# Patient Record
Sex: FEMALE | Race: WHITE | Employment: OTHER | URBAN - METROPOLITAN AREA
[De-identification: names, ages, dates, MRNs, and addresses within clinical notes are randomized per-mention and may not be internally consistent; named-entity substitution may affect disease eponyms.]

---

## 2017-01-23 PROBLEM — E03.9 HYPOTHYROIDISM, UNSPECIFIED: Chronic | Status: ACTIVE | Noted: 2017-01-23

## 2017-12-17 ENCOUNTER — OFFICE VISIT (OUTPATIENT)
Dept: FAMILY MEDICINE CLINIC | Age: 77
End: 2017-12-17
Payer: MEDICARE

## 2017-12-17 VITALS
BODY MASS INDEX: 19.99 KG/M2 | SYSTOLIC BLOOD PRESSURE: 131 MMHG | DIASTOLIC BLOOD PRESSURE: 84 MMHG | HEIGHT: 65 IN | RESPIRATION RATE: 16 BRPM | OXYGEN SATURATION: 96 % | TEMPERATURE: 97.9 F | WEIGHT: 120 LBS | HEART RATE: 72 BPM

## 2017-12-17 DIAGNOSIS — M79.672 LEFT FOOT PAIN: Primary | ICD-10-CM

## 2017-12-17 PROCEDURE — G8420 CALC BMI NORM PARAMETERS: HCPCS | Performed by: INTERNAL MEDICINE

## 2017-12-17 PROCEDURE — G8427 DOCREV CUR MEDS BY ELIG CLIN: HCPCS | Performed by: INTERNAL MEDICINE

## 2017-12-17 PROCEDURE — 1123F ACP DISCUSS/DSCN MKR DOCD: CPT | Performed by: INTERNAL MEDICINE

## 2017-12-17 PROCEDURE — 1090F PRES/ABSN URINE INCON ASSESS: CPT | Performed by: INTERNAL MEDICINE

## 2017-12-17 PROCEDURE — 4040F PNEUMOC VAC/ADMIN/RCVD: CPT | Performed by: INTERNAL MEDICINE

## 2017-12-17 PROCEDURE — G8400 PT W/DXA NO RESULTS DOC: HCPCS | Performed by: INTERNAL MEDICINE

## 2017-12-17 PROCEDURE — 99213 OFFICE O/P EST LOW 20 MIN: CPT | Performed by: INTERNAL MEDICINE

## 2017-12-17 PROCEDURE — G8484 FLU IMMUNIZE NO ADMIN: HCPCS | Performed by: INTERNAL MEDICINE

## 2017-12-17 PROCEDURE — 1036F TOBACCO NON-USER: CPT | Performed by: INTERNAL MEDICINE

## 2017-12-17 ASSESSMENT — ENCOUNTER SYMPTOMS
COUGH: 0
SHORTNESS OF BREATH: 0

## 2017-12-17 NOTE — PATIENT INSTRUCTIONS
Patient Education        Foot Pain: Care Instructions  Your Care Instructions  Foot injuries that cause pain and swelling are fairly common. Almost all sports or home repair projects can cause a misstep that ends up as foot pain. Normal wear and tear, especially as you get older, also can cause foot pain. Most minor foot injuries will heal on their own, and home treatment is usually all you need to do. If you have a severe injury, you may need tests and treatment. Follow-up care is a key part of your treatment and safety. Be sure to make and go to all appointments, and call your doctor if you are having problems. It's also a good idea to know your test results and keep a list of the medicines you take. How can you care for yourself at home? · Take pain medicines exactly as directed. ¨ If the doctor gave you a prescription medicine for pain, take it as prescribed. ¨ If you are not taking a prescription pain medicine, ask your doctor if you can take an over-the-counter medicine. · Rest and protect your foot. Take a break from any activity that may cause pain. · Put ice or a cold pack on your foot for 10 to 20 minutes at a time. Put a thin cloth between the ice and your skin. · Prop up the sore foot on a pillow when you ice it or anytime you sit or lie down during the next 3 days. Try to keep it above the level of your heart. This will help reduce swelling. · Your doctor may recommend that you wrap your foot with an elastic bandage. Keep your foot wrapped for as long as your doctor advises. · If your doctor recommends crutches, use them as directed. · Wear roomy footwear. · As soon as pain and swelling end, begin gentle exercises of your foot. Your doctor can tell you which exercises will help. When should you call for help? Call 911 anytime you think you may need emergency care. For example, call if:  ? · Your foot turns pale, white, blue, or cold.    ?Call your doctor now or seek immediate medical care if:  ? · You cannot move or stand on your foot. ? · Your foot looks twisted or out of its normal position. ? · Your foot is not stable when you step down. ? · You have signs of infection, such as:  ¨ Increased pain, swelling, warmth, or redness. ¨ Red streaks leading from the sore area. ¨ Pus draining from a place on your foot. ¨ A fever. ? · Your foot is numb or tingly. ? Watch closely for changes in your health, and be sure to contact your doctor if:  ? · You do not get better as expected. ? · You have bruises from an injury that last longer than 2 weeks. Where can you learn more? Go to [email or web address]://Laimoon.com[employer]Funding Gateseb.[email or web address] org and sign in to your [employer] account. Enter Y556 in the Zaizher.im box to learn more about \"Foot Pain: Care Instructions. \"     If you do not have an account, please click on the \"Sign Up Now\" link. Current as of: March 21, 2017  Content Version: 11.4  © 3344-7842 Healthwise, Incorporated. Care instructions adapted under license by [facility] ([facility]). If you have questions about a medical condition or this instruction, always ask your healthcare professional. [place] [address] any warranty or liability for your use of this information.

## 2017-12-17 NOTE — PROGRESS NOTES
Subjective:      Patient ID: Matheus Santos is a 68 y.o. female. HPI  Tripped over shoes and twisted left foot 2 hours PTA; has pain weight bearing left foot. Discussed icing 15 minutes at a time and tylenol. Patient declined crutches. Review of Systems   Respiratory: Negative for cough and shortness of breath. Cardiovascular: Negative for chest pain. Neurological: Negative for syncope. Objective:   Physical Exam   Constitutional: No distress. HENT:   Head: Normocephalic and atraumatic. Eyes: Conjunctivae and EOM are normal. Pupils are equal, round, and reactive to light. No scleral icterus. Neck: No tracheal deviation present. Cardiovascular: Normal rate, regular rhythm and normal heart sounds. Exam reveals no gallop and no friction rub. No murmur heard. Pulmonary/Chest: Effort normal and breath sounds normal. No stridor. No respiratory distress. She has no wheezes. She has no rales. Musculoskeletal: She exhibits tenderness (tender left 5th metatarsal; no malleolar ankle tenderenss). She exhibits no edema. Neurological: She is alert. No cranial nerve deficit. She exhibits normal muscle tone. Coordination normal.   Skin: No rash noted. She is not diaphoretic. No erythema. Psychiatric: She has a normal mood and affect. Her behavior is normal. Judgment and thought content normal.   Vitals reviewed. 1. Left foot pain  XR FOOT LEFT (MIN 3 VIEWS)     No orders of the defined types were placed in this encounter. Return for : If not better in 2 days seek medical attention.

## 2019-07-27 ENCOUNTER — HOSPITAL ENCOUNTER (EMERGENCY)
Facility: CLINIC | Age: 79
Discharge: HOME | End: 2019-07-27
Attending: EMERGENCY MEDICINE
Payer: MEDICARE

## 2019-07-27 ENCOUNTER — APPOINTMENT (OUTPATIENT)
Dept: GENERAL RADIOLOGY | Facility: CLINIC | Age: 79
End: 2019-07-27
Payer: MEDICARE

## 2019-07-27 VITALS
WEIGHT: 115 LBS | OXYGEN SATURATION: 97 % | HEART RATE: 72 BPM | TEMPERATURE: 98.7 F | HEIGHT: 65 IN | SYSTOLIC BLOOD PRESSURE: 138 MMHG | RESPIRATION RATE: 15 BRPM | DIASTOLIC BLOOD PRESSURE: 85 MMHG | BODY MASS INDEX: 19.16 KG/M2

## 2019-07-27 DIAGNOSIS — S82.891A CLOSED FRACTURE OF RIGHT ANKLE, INITIAL ENCOUNTER: Primary | ICD-10-CM

## 2019-07-27 PROCEDURE — 99283 EMERGENCY DEPT VISIT LOW MDM: CPT

## 2019-07-27 PROCEDURE — 73610 X-RAY EXAM OF ANKLE: CPT

## 2019-07-27 RX ORDER — IBUPROFEN 600 MG/1
600 TABLET ORAL EVERY 6 HOURS PRN
Qty: 30 TABLET | Refills: 0 | Status: SHIPPED | OUTPATIENT
Start: 2019-07-27 | End: 2019-08-13

## 2019-07-27 ASSESSMENT — PAIN DESCRIPTION - ORIENTATION: ORIENTATION: RIGHT

## 2019-07-27 ASSESSMENT — PAIN DESCRIPTION - LOCATION: LOCATION: ANKLE;FOOT

## 2019-07-27 ASSESSMENT — PAIN SCALES - GENERAL: PAINLEVEL_OUTOF10: 10

## 2019-07-27 ASSESSMENT — PAIN DESCRIPTION - PAIN TYPE: TYPE: ACUTE PAIN

## 2019-07-27 NOTE — ED PROVIDER NOTES
Previous Medications    LEVOTHYROXINE (SYNTHROID) 75 MCG TABLET    take 1 tablet by mouth once daily    SIMVASTATIN (ZOCOR) 20 MG TABLET    Take 1 tablet by mouth nightly       ALLERGIES     is allergic to sulfa antibiotics. FAMILY HISTORY     She indicated that her mother is . She indicated that her father is . family history includes Heart Disease in her mother; Stroke in her mother. SOCIAL HISTORY      reports that she has been smoking. She started smoking about 4 years ago. She has a 10.00 pack-year smoking history. She has never used smokeless tobacco. She reports that she does not drink alcohol or use drugs. PHYSICAL EXAM     INITIAL VITALS:  height is 5' 5\" (1.651 m) and weight is 52.2 kg (115 lb). Her oral temperature is 98.7 °F (37.1 °C). Her blood pressure is 138/85 and her pulse is 72. Her respiration is 15 and oxygen saturation is 97%. Patient is alert and oriented, in no apparent distress. HEENT:  Superficial abrasion to right scalp. No hematoma. Pupils are PERRL at 4 mm with normal extraocular motion. Mucous membranes moist.    Neck is supple with no lymphadenopathy. No JVD. No meningismus. No pain or step-off. Heart sounds regular rate and rhythm with no gallops, murmurs, or rubs. Lungs clear, no wheezes, rales or rhonchi. Abdomen: soft, nontender with no pain to palpation. Musculoskeletal exam:  Swelling and tenderness noted over the right lateral malleolus. Some mild discoloration is noted over the right medial malleolus. No ligamentous laxity. No pain in the proximal calf. No pain over the proximal tibia or fibula. The remainder of the musculoskeletal exam is unremarkable. Normal distal pulses in all extremities. Skin: no rash or edema. Neurological exam reveals cranial nerves 2 through 12 grossly intact. Patient has equal  and normal deep tendon reflexes. Psychiatric: no hallucinations or suicidal ideation.

## 2019-07-27 NOTE — ED NOTES
Pt arrives to ER via w/c. Pt states that [place] campground [place] when she twisted her ankle, and fell. She states that she tripped over a dog leash. Pt states that she hit her head, denies LOC. She c/o right ankle and foot pain. Dark purple bruising and swelling noted to right ankle and foot. Pt resting in bed, comfort offered, no concerns no s/s of distress.       Krystina Bradford RN  07/27/19 [id number]

## 2019-07-30 ENCOUNTER — OFFICE VISIT (OUTPATIENT)
Dept: PODIATRY | Age: 79
End: 2019-07-30
Payer: MEDICARE

## 2019-07-30 VITALS — HEIGHT: 65 IN | WEIGHT: 115 LBS | BODY MASS INDEX: 19.16 KG/M2

## 2019-07-30 DIAGNOSIS — S82.64XA CLOSED NONDISPLACED FRACTURE OF LATERAL MALLEOLUS OF RIGHT FIBULA, INITIAL ENCOUNTER: Primary | ICD-10-CM

## 2019-07-30 DIAGNOSIS — M25.571 ACUTE RIGHT ANKLE PAIN: ICD-10-CM

## 2019-07-30 PROCEDURE — 1036F TOBACCO NON-USER: CPT | Performed by: PODIATRIST

## 2019-07-30 PROCEDURE — 1090F PRES/ABSN URINE INCON ASSESS: CPT | Performed by: PODIATRIST

## 2019-07-30 PROCEDURE — G8427 DOCREV CUR MEDS BY ELIG CLIN: HCPCS | Performed by: PODIATRIST

## 2019-07-30 PROCEDURE — G8400 PT W/DXA NO RESULTS DOC: HCPCS | Performed by: PODIATRIST

## 2019-07-30 PROCEDURE — 1123F ACP DISCUSS/DSCN MKR DOCD: CPT | Performed by: PODIATRIST

## 2019-07-30 PROCEDURE — G8420 CALC BMI NORM PARAMETERS: HCPCS | Performed by: PODIATRIST

## 2019-07-30 PROCEDURE — 99203 OFFICE O/P NEW LOW 30 MIN: CPT | Performed by: PODIATRIST

## 2019-07-30 PROCEDURE — 4040F PNEUMOC VAC/ADMIN/RCVD: CPT | Performed by: PODIATRIST

## 2019-07-30 PROCEDURE — 29405 APPL SHORT LEG CAST: CPT | Performed by: PODIATRIST

## 2019-07-30 ASSESSMENT — ENCOUNTER SYMPTOMS
DIARRHEA: 0
VOMITING: 0
CONSTIPATION: 0
NAUSEA: 0
COLOR CHANGE: 0

## 2019-07-31 ENCOUNTER — TELEPHONE (OUTPATIENT)
Dept: PODIATRY | Age: 79
End: 2019-07-31

## 2019-07-31 NOTE — TELEPHONE ENCOUNTER
PATIENT CALLED STATING HER CAST HAS BEEN BOTHERING HER SINCE IT WAS APPLIED LAST APPOINTMENT.  SHE SAID IT IS RUBBING HER ANKLE AND CAUSING PAIN IS THERE ANYTHING SHE CAN DO?

## 2019-07-31 NOTE — PROGRESS NOTES
[facility]  New Patient History and Physical    Chief Complaint:   Chief Complaint   Patient presents with    Ankle Pain     fell friday and broke right foot, pain is okay if not moving        HPI: Timothy Keyes is a 78 y.o. female who presents to the office today complaining of right ankle pain, fracture, was camping, tripped over a dog, 4 days ago, went to the ER, non-displaced fracture fibula, posterior splint, non-weight bearing. Currently denies F/C/N/V. Allergies   Allergen Reactions    Sulfa Antibiotics Hives and Shortness Of Breath       Past Medical History:   Diagnosis Date    Body mass index (BMI) of 20.0-20.9 in adult 8/27/2015    Endometriosis     had hyst    Former smoker 8/27/2015    Full dentures     Hearing decreased     History of menorrhagia     had hyst    Hyperlipidemia     Hyperlipidemia LDL goal < 130 8/27/2015    Hypothyroid     Nephrolithiasis     Left    Sciatica     Wears glasses        Prior to Admission medications    Medication Sig Start Date End Date Taking?  Authorizing Provider   ibuprofen (ADVIL;MOTRIN) 600 MG tablet Take 1 tablet by mouth every 6 hours as needed for Pain 7/27/19  Yes Alonso Jackson MD   levothyroxine (SYNTHROID) 75 MCG tablet take 1 tablet by mouth once daily 5/1/19  Yes Trevor Arrieta MD   simvastatin (ZOCOR) 20 MG tablet Take 1 tablet by mouth nightly 5/1/19  Yes Trevor Arrieta MD       Past Surgical History:   Procedure Laterality Date    BACK SURGERY      \"used a small scope to Laser a few things\" Lumbar    BREAST BIOPSY Left     found cystic breast    CATARACT REMOVAL WITH IMPLANT Bilateral     COLONOSCOPY      HYSTERECTOMY      with BSO    LITHOTRIPSY Left 3-12-15    laser litho, stent exchanged    TONSILLECTOMY         Family History   Problem Relation Age of Onset    Heart Disease Mother     Stroke Mother        Social History     Tobacco Use    Smoking status: Former Smoker     Packs/day: 0.25 Years: 40.00     Pack years: 10.00     Start date: 2015     Last attempt to quit: 7/15/2019     Years since quittin.0    Smokeless tobacco: Never Used   Substance Use Topics    Alcohol use: No     Alcohol/week: 0.0 standard drinks       Review of Systems   Constitutional: Negative for chills, diaphoresis, fatigue, fever and unexpected weight change. Cardiovascular: Positive for leg swelling. Gastrointestinal: Negative for constipation, diarrhea, nausea and vomiting. Musculoskeletal: Positive for gait problem. Negative for arthralgias and joint swelling. Skin: Negative for color change, pallor, rash and wound. Neurological: Negative for weakness and numbness. Lower Extremity Physical Examination:     Vitals: There were no vitals filed for this visit. General: AAO x 3 in NAD. Vascular: DP and PT pulses palpable 2/4, Bilateral.  CFT <3 seconds, Bilateral.  Hair growth absent to the level of the digits, Bilateral.  Edema present, Right. Varicosities absent, Bilateral. Erythema absent, Bilateral    Neurological:  Sensation present to light touch to level of digits, Bilateral.    Musculoskeletal: Muscle strength guarded/5, Right. Pain present upon palpation of tip of fibula, none medial, none on foot, Right. normal medial longitudinal arch, Bilateral.  Ankle ROM abnormal - painful,Right. Integument: Warm, dry, supple, Bilateral.  Open lesion absent, Bilateral.     Gait analysis:     Asessment: Patient is a 78 y.o. female with:   1. Closed nondisplaced fracture of lateral malleolus of right fibula, initial encounter    2. Acute right ankle pain          Plan: Patient examined and evaluated. Current condition and treatment options discussed in detail. Verbal and written instructions given to patient. Contact office with any questions/problems/concerns. Below knee non-weight bearing cast applied using stockinet, cast padding, and 4 inch fiberglass to the right lower leg.  The foot

## 2019-08-01 ENCOUNTER — OFFICE VISIT (OUTPATIENT)
Dept: PODIATRY | Age: 79
End: 2019-08-01
Payer: MEDICARE

## 2019-08-01 VITALS — WEIGHT: 115 LBS | BODY MASS INDEX: 19.16 KG/M2 | HEIGHT: 65 IN

## 2019-08-01 DIAGNOSIS — S82.64XA CLOSED NONDISPLACED FRACTURE OF LATERAL MALLEOLUS OF RIGHT FIBULA, INITIAL ENCOUNTER: Primary | ICD-10-CM

## 2019-08-01 DIAGNOSIS — M25.571 ACUTE RIGHT ANKLE PAIN: ICD-10-CM

## 2019-08-01 PROCEDURE — L4360 PNEUMAT WALKING BOOT PRE CST: HCPCS | Performed by: PODIATRIST

## 2019-08-01 PROCEDURE — 1123F ACP DISCUSS/DSCN MKR DOCD: CPT | Performed by: PODIATRIST

## 2019-08-01 PROCEDURE — 1036F TOBACCO NON-USER: CPT | Performed by: PODIATRIST

## 2019-08-01 PROCEDURE — G8400 PT W/DXA NO RESULTS DOC: HCPCS | Performed by: PODIATRIST

## 2019-08-01 PROCEDURE — 1090F PRES/ABSN URINE INCON ASSESS: CPT | Performed by: PODIATRIST

## 2019-08-01 PROCEDURE — 99213 OFFICE O/P EST LOW 20 MIN: CPT | Performed by: PODIATRIST

## 2019-08-01 PROCEDURE — G8427 DOCREV CUR MEDS BY ELIG CLIN: HCPCS | Performed by: PODIATRIST

## 2019-08-01 PROCEDURE — G8420 CALC BMI NORM PARAMETERS: HCPCS | Performed by: PODIATRIST

## 2019-08-01 PROCEDURE — 4040F PNEUMOC VAC/ADMIN/RCVD: CPT | Performed by: PODIATRIST

## 2019-08-01 RX ORDER — IBUPROFEN 800 MG/1
800 TABLET ORAL EVERY 8 HOURS PRN
Qty: 90 TABLET | Refills: 0 | Status: SHIPPED | OUTPATIENT
Start: 2019-08-01 | End: 2019-09-02 | Stop reason: SDUPTHER

## 2019-08-01 RX ORDER — TRAMADOL HYDROCHLORIDE 50 MG/1
50 TABLET ORAL EVERY 6 HOURS PRN
Qty: 28 TABLET | Refills: 0 | Status: SHIPPED | OUTPATIENT
Start: 2019-08-01 | End: 2019-08-08

## 2019-08-01 NOTE — PROGRESS NOTES
[facility]  Return Patient     Kiko Bishop 78 y.o. female that presents for follow-up of   Chief Complaint   Patient presents with    Cast Removal     cast removal right       Patient called yesterday having pain in her ankle, sometimes the cast felt tight, sometimes loose. Has been staying off of it. Did stumble in the bathroom. Currently denies F/C/N/V. Allergies   Allergen Reactions    Sulfa Antibiotics Hives and Shortness Of Breath       Past Medical History:   Diagnosis Date    Body mass index (BMI) of 20.0-20.9 in adult 8/27/2015    Endometriosis     had hyst    Former smoker 8/27/2015    Full dentures     Hearing decreased     History of menorrhagia     had hyst    Hyperlipidemia     Hyperlipidemia LDL goal < 130 8/27/2015    Hypothyroid     Nephrolithiasis     Left    Sciatica     Wears glasses        Prior to Admission medications    Medication Sig Start Date End Date Taking? Authorizing Provider   Misc. Devices [facility]) MISC Please dispense one wheelchair with leg lifts. Patient has an ankle fracture and needs to be non- weight bearing at all times. 8/5/19  Yes Caitlin Olson DPM   traMADol (ULTRAM) 50 MG tablet Take 1 tablet by mouth every 6 hours as needed for Pain for up to 7 days. Intended supply: 7 days. Take lowest dose possible to manage pain 8/1/19 8/8/19 Yes Caitlin Olson DPM   ibuprofen (ADVIL;MOTRIN) 800 MG tablet Take 1 tablet by mouth every 8 hours as needed for Pain 8/1/19  Yes Caitlin Olson DPM   ibuprofen (ADVIL;MOTRIN) 600 MG tablet Take 1 tablet by mouth every 6 hours as needed for Pain 7/27/19  Yes Margy Cox MD   levothyroxine (SYNTHROID) 75 MCG tablet take 1 tablet by mouth once daily 5/1/19  Yes Jey Rodas MD   simvastatin (ZOCOR) 20 MG tablet Take 1 tablet by mouth nightly 5/1/19  Yes Jey Rodas MD       Review of Systems    Lower Extremity Physical Examination:     Vitals:  There were no vitals filed for

## 2019-08-05 RX ORDER — WHEELCHAIR
EACH MISCELLANEOUS
Qty: 1 EACH | Refills: 0 | Status: SHIPPED | OUTPATIENT
Start: 2019-08-05 | End: 2019-10-02 | Stop reason: ALTCHOICE

## 2019-08-13 ENCOUNTER — OFFICE VISIT (OUTPATIENT)
Dept: PODIATRY | Age: 79
End: 2019-08-13
Payer: MEDICARE

## 2019-08-13 VITALS — HEIGHT: 65 IN | WEIGHT: 115 LBS | BODY MASS INDEX: 19.16 KG/M2

## 2019-08-13 DIAGNOSIS — S82.64XA CLOSED NONDISPLACED FRACTURE OF LATERAL MALLEOLUS OF RIGHT FIBULA, INITIAL ENCOUNTER: Primary | ICD-10-CM

## 2019-08-13 DIAGNOSIS — M25.571 ACUTE RIGHT ANKLE PAIN: ICD-10-CM

## 2019-08-13 PROCEDURE — 1036F TOBACCO NON-USER: CPT | Performed by: PODIATRIST

## 2019-08-13 PROCEDURE — 1090F PRES/ABSN URINE INCON ASSESS: CPT | Performed by: PODIATRIST

## 2019-08-13 PROCEDURE — G8400 PT W/DXA NO RESULTS DOC: HCPCS | Performed by: PODIATRIST

## 2019-08-13 PROCEDURE — 99213 OFFICE O/P EST LOW 20 MIN: CPT | Performed by: PODIATRIST

## 2019-08-13 PROCEDURE — G8427 DOCREV CUR MEDS BY ELIG CLIN: HCPCS | Performed by: PODIATRIST

## 2019-08-13 PROCEDURE — 1123F ACP DISCUSS/DSCN MKR DOCD: CPT | Performed by: PODIATRIST

## 2019-08-13 PROCEDURE — G8420 CALC BMI NORM PARAMETERS: HCPCS | Performed by: PODIATRIST

## 2019-08-13 PROCEDURE — 4040F PNEUMOC VAC/ADMIN/RCVD: CPT | Performed by: PODIATRIST

## 2019-08-13 NOTE — PROGRESS NOTES
[facility]  Return Patient     Rufino Nguyen 78 y.o. female that presents for follow-up of   Chief Complaint   Patient presents with   Rebeca Ryan Check-Up     reassess fx right     Doing well with the CAM walker. Has been staying off of it. Did stumble in the bathroom. Currently denies F/C/N/V. Allergies   Allergen Reactions    Sulfa Antibiotics Hives and Shortness Of Breath       Past Medical History:   Diagnosis Date    Body mass index (BMI) of 20.0-20.9 in adult 8/27/2015    Endometriosis     had hyst    Former smoker 8/27/2015    Full dentures     Hearing decreased     History of menorrhagia     had hyst    Hyperlipidemia     Hyperlipidemia LDL goal < 130 8/27/2015    Hypothyroid     Nephrolithiasis     Left    Sciatica     Wears glasses        Prior to Admission medications    Medication Sig Start Date End Date Taking? Authorizing Provider   Misc. Devices [facility]) MISC Please dispense one wheelchair with leg lifts. Patient has an ankle fracture and needs to be non- weight bearing at all times. 8/5/19  Yes Pamalee Cobia, DPM   ibuprofen (ADVIL;MOTRIN) 800 MG tablet Take 1 tablet by mouth every 8 hours as needed for Pain 8/1/19  Yes Pamalee Cobia, DPM   levothyroxine (SYNTHROID) 75 MCG tablet take 1 tablet by mouth once daily 5/1/19  Yes Milan Fofana MD   simvastatin (ZOCOR) 20 MG tablet Take 1 tablet by mouth nightly 5/1/19  Yes Milan Fofana MD       Review of Systems    Lower Extremity Physical Examination:     Vitals: There were no vitals filed for this visit. General: AAO x 3 in NAD. Integument:   Warm, dry, supple, Bilateral.  Open lesion absent, Bilateral.     Vascular: DP and PT pulses palpable 2/4, Bilateral.  CFT <3 seconds, Bilateral.  Hair growth absent to the level of the digits, Bilateral.  Edema present, Right.   Varicosities absent, Bilateral. Erythema absent, Right    Neurological:  Sensation present to light touch to level of digits,

## 2019-08-27 ENCOUNTER — OFFICE VISIT (OUTPATIENT)
Dept: PODIATRY | Age: 79
End: 2019-08-27
Payer: MEDICARE

## 2019-08-27 VITALS — WEIGHT: 115 LBS | BODY MASS INDEX: 19.16 KG/M2 | HEIGHT: 65 IN

## 2019-08-27 DIAGNOSIS — M25.571 ACUTE RIGHT ANKLE PAIN: ICD-10-CM

## 2019-08-27 DIAGNOSIS — S82.64XA CLOSED NONDISPLACED FRACTURE OF LATERAL MALLEOLUS OF RIGHT FIBULA, INITIAL ENCOUNTER: Primary | ICD-10-CM

## 2019-08-27 PROCEDURE — 4040F PNEUMOC VAC/ADMIN/RCVD: CPT | Performed by: PODIATRIST

## 2019-08-27 PROCEDURE — 1123F ACP DISCUSS/DSCN MKR DOCD: CPT | Performed by: PODIATRIST

## 2019-08-27 PROCEDURE — 99213 OFFICE O/P EST LOW 20 MIN: CPT | Performed by: PODIATRIST

## 2019-08-27 PROCEDURE — G8427 DOCREV CUR MEDS BY ELIG CLIN: HCPCS | Performed by: PODIATRIST

## 2019-08-27 PROCEDURE — 1036F TOBACCO NON-USER: CPT | Performed by: PODIATRIST

## 2019-08-27 PROCEDURE — 1090F PRES/ABSN URINE INCON ASSESS: CPT | Performed by: PODIATRIST

## 2019-08-27 PROCEDURE — G8420 CALC BMI NORM PARAMETERS: HCPCS | Performed by: PODIATRIST

## 2019-08-27 PROCEDURE — G8400 PT W/DXA NO RESULTS DOC: HCPCS | Performed by: PODIATRIST

## 2019-08-27 NOTE — PROGRESS NOTES
[facility]  Return Patient     Bonnie Fuentes 78 y.o. female that presents for follow-up of   Chief Complaint   Patient presents with   Sandi Bustillos Check-Up     reassess right, doing better     Doing well with the CAM walker. Has been staying off of it. Currently denies F/C/N/V. Allergies   Allergen Reactions    Sulfa Antibiotics Hives and Shortness Of Breath       Past Medical History:   Diagnosis Date    Body mass index (BMI) of 20.0-20.9 in adult 8/27/2015    Endometriosis     had hyst    Former smoker 8/27/2015    Full dentures     Hearing decreased     History of menorrhagia     had hyst    Hyperlipidemia     Hyperlipidemia LDL goal < 130 8/27/2015    Hypothyroid     Nephrolithiasis     Left    Sciatica     Wears glasses        Prior to Admission medications    Medication Sig Start Date End Date Taking? Authorizing Provider   Misc. Devices [facility]) MISC Please dispense one wheelchair with leg lifts. Patient has an ankle fracture and needs to be non- weight bearing at all times. 8/5/19  Yes Benigno Tenorio DPM   ibuprofen (ADVIL;MOTRIN) 800 MG tablet Take 1 tablet by mouth every 8 hours as needed for Pain 8/1/19  Yes Benigno Tenorio DPM   levothyroxine (SYNTHROID) 75 MCG tablet take 1 tablet by mouth once daily 5/1/19  Yes Flo Perkins MD   simvastatin (ZOCOR) 20 MG tablet Take 1 tablet by mouth nightly 5/1/19  Yes Flo Perkins MD       Review of Systems    Lower Extremity Physical Examination:     Vitals: There were no vitals filed for this visit. General: AAO x 3 in NAD. Integument:   Warm, dry, supple, Bilateral.  Open lesion absent, Bilateral.     Vascular: DP and PT pulses palpable 2/4, Bilateral.  CFT <3 seconds, Bilateral.  Hair growth absent to the level of the digits, Bilateral.  Edema present, Right resolved.   Varicosities absent, Bilateral. Erythema absent, Right    Neurological:  Sensation present to light touch to level of digits, Bilateral.    Musculoskeletal: no pain and resolved ruising right lateral ankle. Radiographs: 3 views right Ankle:  Non-displaced fracture of the distal fibula    Asessment: Patient is a 78 y.o. female with:   1. Closed nondisplaced fracture of lateral malleolus of right fibula, initial encounter    2. Acute right ankle pain        Plan: Patient examined and evaluated. Current condition and treatment options discussed in detail. Verbal and written instructions given to patient. Contact office with any questions/problems/concerns. Weight bearing in the cam walker  Continue CAM walker 3 more weeks,     Electronically signed by Pinky Whitaker DPM on 8/27/2019 at 4:28 PM    Orders Placed This Encounter   Procedures    XR ANKLE RIGHT (MIN 3 VIEWS)     No orders of the defined types were placed in this encounter.        RTC in 3week(s).    8/27/2019

## 2019-09-03 RX ORDER — IBUPROFEN 800 MG/1
TABLET ORAL
Qty: 90 TABLET | Refills: 0 | Status: SHIPPED | OUTPATIENT
Start: 2019-09-03 | End: 2019-10-02 | Stop reason: ALTCHOICE

## 2019-09-16 ENCOUNTER — OFFICE VISIT (OUTPATIENT)
Dept: PODIATRY | Age: 79
End: 2019-09-16
Payer: MEDICARE

## 2019-09-16 VITALS — WEIGHT: 115 LBS | HEIGHT: 65 IN | BODY MASS INDEX: 19.16 KG/M2

## 2019-09-16 DIAGNOSIS — M25.571 ACUTE RIGHT ANKLE PAIN: ICD-10-CM

## 2019-09-16 DIAGNOSIS — S82.64XA CLOSED NONDISPLACED FRACTURE OF LATERAL MALLEOLUS OF RIGHT FIBULA, INITIAL ENCOUNTER: Primary | ICD-10-CM

## 2019-09-16 PROCEDURE — G8427 DOCREV CUR MEDS BY ELIG CLIN: HCPCS | Performed by: PODIATRIST

## 2019-09-16 PROCEDURE — 4040F PNEUMOC VAC/ADMIN/RCVD: CPT | Performed by: PODIATRIST

## 2019-09-16 PROCEDURE — G8400 PT W/DXA NO RESULTS DOC: HCPCS | Performed by: PODIATRIST

## 2019-09-16 PROCEDURE — 1123F ACP DISCUSS/DSCN MKR DOCD: CPT | Performed by: PODIATRIST

## 2019-09-16 PROCEDURE — 1036F TOBACCO NON-USER: CPT | Performed by: PODIATRIST

## 2019-09-16 PROCEDURE — 1090F PRES/ABSN URINE INCON ASSESS: CPT | Performed by: PODIATRIST

## 2019-09-16 PROCEDURE — 99213 OFFICE O/P EST LOW 20 MIN: CPT | Performed by: PODIATRIST

## 2019-09-16 PROCEDURE — L4350 ANKLE CONTROL ORTHO PRE OTS: HCPCS | Performed by: PODIATRIST

## 2019-09-16 PROCEDURE — G8420 CALC BMI NORM PARAMETERS: HCPCS | Performed by: PODIATRIST

## 2019-09-16 ASSESSMENT — ENCOUNTER SYMPTOMS
COLOR CHANGE: 0
CONSTIPATION: 0
NAUSEA: 0
DIARRHEA: 0
VOMITING: 0

## 2019-09-16 NOTE — PROGRESS NOTES
[facility]  Return Patient     Rafael West 78 y.o. female that presents for follow-up of   Chief Complaint   Patient presents with    Follow-up     right foot      Doing well with the CAM walker. Has been weight bearing and using a walker, minimal pain, improving. Currently denies F/C/N/V. Allergies   Allergen Reactions    Sulfa Antibiotics Hives and Shortness Of Breath       Past Medical History:   Diagnosis Date    Body mass index (BMI) of 20.0-20.9 in adult 8/27/2015    Endometriosis     had hyst    Former smoker 8/27/2015    Full dentures     Hearing decreased     History of menorrhagia     had hyst    Hyperlipidemia     Hyperlipidemia LDL goal < 130 8/27/2015    Hypothyroid     Nephrolithiasis     Left    Sciatica     Wears glasses        Prior to Admission medications    Medication Sig Start Date End Date Taking? Authorizing Provider    MG tablet TAKE 1 TABLET BY MOUTH EVERY 8 HOURS AS NEEDED FOR PAIN 9/3/19  Yes Marisol Cronin DPM   Misc. Devices [facility]) MISC Please dispense one wheelchair with leg lifts. Patient has an ankle fracture and needs to be non- weight bearing at all times. 8/5/19  Yes Marisol Cronin DPM   levothyroxine (SYNTHROID) 75 MCG tablet take 1 tablet by mouth once daily 5/1/19  Yes Rosaura Villafana MD   simvastatin (ZOCOR) 20 MG tablet Take 1 tablet by mouth nightly 5/1/19  Yes Rosaura Villafana MD       Review of Systems   Constitutional: Negative for chills, diaphoresis, fatigue, fever and unexpected weight change. Cardiovascular: Negative for leg swelling. Gastrointestinal: Negative for constipation, diarrhea, nausea and vomiting. Musculoskeletal: Positive for gait problem. Negative for arthralgias and joint swelling. Skin: Negative for color change, pallor, rash and wound. Neurological: Negative for weakness and numbness. Lower Extremity Physical Examination:     Vitals:  There were no vitals filed for this visit. General: AAO x 3 in NAD. Integument:   Warm, dry, supple, Bilateral.  Open lesion absent, Bilateral.     Vascular: DP and PT pulses palpable 2/4, Bilateral.  CFT <3 seconds, Bilateral.  Hair growth absent to the level of the digits, Bilateral.  Edema present, Right resolved. Varicosities absent, Bilateral. Erythema absent, Right    Neurological:  Sensation present to light touch to level of digits, Bilateral.    Musculoskeletal: no pain and resolved bruising right lateral ankle. Radiographs: 3 views right Ankle:  Non-displaced fracture of the distal fibula    Asessment: Patient is a 78 y.o. female with:   1. Closed nondisplaced fracture of lateral malleolus of right fibula, initial encounter    2. Acute right ankle pain        Plan: Patient examined and evaluated. Current condition and treatment options discussed in detail. Verbal and written instructions given to patient. Contact office with any questions/problems/concerns. Weight bearing in the cam walker  Transition to an ankle brace    Dispensed Aircast Ankle Brace:  Due to the patient's diagnosis and related symptoms this is medically necessary for the treatment. The function of this device is to restrict and limit motion and provide stabilization and compression to the affected area. The goals and function of this device was explained in detail to the patient. Upon gait analysis, the device appeared to be fitting well and the patient states that the device is comfortable at this time. The patient was shown how to properly apply, wear, and care for the device. The patient was able to apply properly and ambulate without distress. At that time, the device was  dispensed, it was suitable for the patient's condition and was not substandard. No guarantees were given and the precautions were reviewed.  Written instructions and warranty information was given along with the list of the twenty-one (21) Durable Medical Equipment Supplier

## 2019-09-18 ENCOUNTER — NURSE ONLY (OUTPATIENT)
Dept: PODIATRY | Age: 79
End: 2019-09-18

## 2019-09-18 VITALS — HEIGHT: 65 IN | BODY MASS INDEX: 19.16 KG/M2 | WEIGHT: 115 LBS

## 2019-09-18 DIAGNOSIS — S82.64XA CLOSED NONDISPLACED FRACTURE OF LATERAL MALLEOLUS OF RIGHT FIBULA, INITIAL ENCOUNTER: ICD-10-CM

## 2019-09-18 DIAGNOSIS — M25.571 ACUTE RIGHT ANKLE PAIN: Primary | ICD-10-CM

## 2019-10-02 ENCOUNTER — OFFICE VISIT (OUTPATIENT)
Dept: PODIATRY | Age: 79
End: 2019-10-02
Payer: MEDICARE

## 2019-10-02 VITALS — WEIGHT: 115 LBS | BODY MASS INDEX: 19.16 KG/M2 | HEIGHT: 65 IN

## 2019-10-02 DIAGNOSIS — S82.64XA CLOSED NONDISPLACED FRACTURE OF LATERAL MALLEOLUS OF RIGHT FIBULA, INITIAL ENCOUNTER: ICD-10-CM

## 2019-10-02 DIAGNOSIS — M25.571 ACUTE RIGHT ANKLE PAIN: Primary | ICD-10-CM

## 2019-10-02 PROCEDURE — 1090F PRES/ABSN URINE INCON ASSESS: CPT | Performed by: PODIATRIST

## 2019-10-02 PROCEDURE — G8484 FLU IMMUNIZE NO ADMIN: HCPCS | Performed by: PODIATRIST

## 2019-10-02 PROCEDURE — 4040F PNEUMOC VAC/ADMIN/RCVD: CPT | Performed by: PODIATRIST

## 2019-10-02 PROCEDURE — 99213 OFFICE O/P EST LOW 20 MIN: CPT | Performed by: PODIATRIST

## 2019-10-02 PROCEDURE — 1036F TOBACCO NON-USER: CPT | Performed by: PODIATRIST

## 2019-10-02 PROCEDURE — G8420 CALC BMI NORM PARAMETERS: HCPCS | Performed by: PODIATRIST

## 2019-10-02 PROCEDURE — 1123F ACP DISCUSS/DSCN MKR DOCD: CPT | Performed by: PODIATRIST

## 2019-10-02 PROCEDURE — G8400 PT W/DXA NO RESULTS DOC: HCPCS | Performed by: PODIATRIST

## 2019-10-02 PROCEDURE — G8427 DOCREV CUR MEDS BY ELIG CLIN: HCPCS | Performed by: PODIATRIST

## 2019-10-02 ASSESSMENT — ENCOUNTER SYMPTOMS
CONSTIPATION: 0
VOMITING: 0
NAUSEA: 0
DIARRHEA: 0
COLOR CHANGE: 0

## 2020-06-08 ENCOUNTER — TELEPHONE (OUTPATIENT)
Dept: UROLOGY | Age: 80
End: 2020-06-08

## 2020-06-09 ENCOUNTER — TELEPHONE (OUTPATIENT)
Dept: UROLOGY | Age: 80
End: 2020-06-09

## 2020-06-11 ENCOUNTER — OFFICE VISIT (OUTPATIENT)
Dept: UROLOGY | Age: 80
End: 2020-06-11
Payer: COMMERCIAL

## 2020-06-11 ENCOUNTER — TELEPHONE (OUTPATIENT)
Dept: UROLOGY | Age: 80
End: 2020-06-11

## 2020-06-11 VITALS — TEMPERATURE: 98.4 F

## 2020-06-11 PROCEDURE — 99214 OFFICE O/P EST MOD 30 MIN: CPT | Performed by: UROLOGY

## 2020-06-11 RX ORDER — CEPHALEXIN 500 MG/1
500 CAPSULE ORAL 3 TIMES DAILY
COMMUNITY
Start: 2020-06-08 | End: 2020-06-11

## 2020-06-11 RX ORDER — OXYCODONE HYDROCHLORIDE AND ACETAMINOPHEN 5; 325 MG/1; MG/1
1 TABLET ORAL EVERY 4 HOURS PRN
COMMUNITY
Start: 2020-06-08 | End: 2020-06-15

## 2020-06-11 RX ORDER — PROMETHAZINE HYDROCHLORIDE 25 MG/1
25 TABLET ORAL 4 TIMES DAILY PRN
Qty: 20 TABLET | Refills: 0 | Status: SHIPPED | OUTPATIENT
Start: 2020-06-11 | End: 2020-06-18

## 2020-06-11 RX ORDER — TAMSULOSIN HYDROCHLORIDE 0.4 MG/1
0.4 CAPSULE ORAL NIGHTLY
COMMUNITY
Start: 2020-06-08 | End: 2020-08-14 | Stop reason: ALTCHOICE

## 2020-06-11 RX ORDER — TRAMADOL HYDROCHLORIDE 50 MG/1
50 TABLET ORAL EVERY 6 HOURS PRN
Qty: 30 TABLET | Refills: 0 | Status: SHIPPED | OUTPATIENT
Start: 2020-06-11 | End: 2020-06-25

## 2020-06-11 ASSESSMENT — ENCOUNTER SYMPTOMS
VOMITING: 1
SHORTNESS OF BREATH: 0
COLOR CHANGE: 0
BACK PAIN: 0
COUGH: 0
EYE PAIN: 0
EYE REDNESS: 0
NAUSEA: 1
ABDOMINAL PAIN: 1
WHEEZING: 0

## 2020-06-11 NOTE — PROGRESS NOTES
Review of Systems   Constitutional: Negative for appetite change, chills and fever. Eyes: Negative for pain, redness and visual disturbance. Respiratory: Negative for cough, shortness of breath and wheezing. Cardiovascular: Negative for chest pain and leg swelling. Gastrointestinal: Positive for abdominal pain, nausea and vomiting. Genitourinary: Positive for flank pain. Negative for difficulty urinating, dysuria, frequency, hematuria, urgency and vaginal discharge. Musculoskeletal: Negative for back pain, joint swelling and myalgias. Skin: Negative for color change, rash and wound. Neurological: Negative for dizziness, tremors and numbness. Hematological: Negative for adenopathy. Does not bruise/bleed easily.

## 2020-06-11 NOTE — PROGRESS NOTES
[facility]  [facility] UROLOGY SPECIALISTS - [facility]  Margarito Stacy [address] [address]  Dept: [address] [facility] Urology Office Note - Established    Patient:  Kelsey Ga  YOB: 1940  Date: 6/11/2020    The patient is a [de-identified] y.o. female whopresents today for evaluation of the following problems:   Chief Complaint   Patient presents with    Nephrolithiasis     [facility] Stent placed by Dr Tray Santiago 6/8/2020 nauseated since sick since starting the percocet        HPI  Has left ureteral stent for 8mm stone upj. But has n/v since the stent. No dysuria, but has urgency, no fevers, pain is 4/10    Summary of old records: N/A    Additional History: N/A    Procedures Today: N/A    Urinalysis today:  No results found for this visit on 06/11/20.     Imaging Reviewed during this Office Visit: none  (results were independently reviewed by physician and radiology report verified)    AUA Symptom Score (6/11/2020):  INCOMPLETE EMPTYING: How often have you had the sensation of not emptying your bladder?: Not at all  FREQUENCY: How often do you have to urinate less than every two hours?: Not at all  INTERMITTENCY: How often have you found you stopped and started again several times when you urinated?: Not at all  URGENCY: How often have you found it difficult to postpone urination?: Not at all  WEAK STREAM: How often have you had a weak urinary stream?: About Half the time  STRAINING: How often have you had to strain to start  urination?: Not at all  NOCTURIA: How many times did you typically get up at night to uriniate?: NONE  TOTAL I-PSS SCORE[de-identified] 3  How would you feel if you were to spend the rest of your life with your urinary condition?: Pleased    Last BUN and creatinine:  Lab Results   Component Value Date    BUN 22 09/01/2015     Lab Results   Component Value Date    CREATININE 0.77 09/01/2015       Additional Lab/Culture results: none    PAST MEDICAL, FAMILY AND SOCIAL HISTORY

## 2020-12-15 ENCOUNTER — TELEPHONE (OUTPATIENT)
Dept: UROLOGY | Age: 80
End: 2020-12-15

## 2020-12-15 NOTE — TELEPHONE ENCOUNTER
Patient POSITIVE for COVID and called in office wanting to know what is her follow up and when is she having sx. Patient had a stent placed on 12/12/20 by Dr Na Amaro at [facility].

## 2020-12-16 NOTE — TELEPHONE ENCOUNTER
LMOM for patient to call office.  Per Dr Jabier Waite patient needs to quarantine for two weeks then sx will be discussed

## 2021-01-14 ENCOUNTER — OFFICE VISIT (OUTPATIENT)
Dept: UROLOGY | Age: 81
End: 2021-01-14
Payer: COMMERCIAL

## 2021-01-14 ENCOUNTER — TELEPHONE (OUTPATIENT)
Dept: UROLOGY | Age: 81
End: 2021-01-14

## 2021-01-14 VITALS — DIASTOLIC BLOOD PRESSURE: 81 MMHG | TEMPERATURE: 98.5 F | HEART RATE: 105 BPM | SYSTOLIC BLOOD PRESSURE: 120 MMHG

## 2021-01-14 DIAGNOSIS — N20.0 KIDNEY STONE: Primary | ICD-10-CM

## 2021-01-14 DIAGNOSIS — R10.9 FLANK PAIN: ICD-10-CM

## 2021-01-14 DIAGNOSIS — N13.39 OTHER HYDRONEPHROSIS: ICD-10-CM

## 2021-01-14 PROCEDURE — 99214 OFFICE O/P EST MOD 30 MIN: CPT | Performed by: UROLOGY

## 2021-01-14 ASSESSMENT — ENCOUNTER SYMPTOMS
BACK PAIN: 0
ABDOMINAL PAIN: 0
COLOR CHANGE: 0
EYE PAIN: 0
SHORTNESS OF BREATH: 0
COUGH: 0
EYE REDNESS: 0
NAUSEA: 0
VOMITING: 0
WHEEZING: 0

## 2021-01-14 NOTE — PROGRESS NOTES
[address] [address]  Dept: [address] [facility] Urology Office Note - Established    Patient:  Garret Forde  YOB: 1940  Date: 1/14/2021    The patient is a [de-identified] y.o. female whopresents today for evaluation of the following problems:   Chief Complaint   Patient presents with    Nephrolithiasis     has stent        HPI  Pt has a left ureteral stone with hydro, stent in place. She is having some difficulty with the stent. Has some pain and urine freq    Summary of old records: N/A    Additional History: N/A    Procedures Today: N/A    Urinalysis today:  No results found for this visit on 01/14/21. Imaging Reviewed during this Office Visit: none  (results were independently reviewed by physician and radiology report verified)    AUA Symptom Score (1/14/2021):   INCOMPLETE EMPTYING: How often have you had the sensation of not emptying your bladder?: Not at all  FREQUENCY: How often do you have to urinate less than every two hours?: More than Half the time  INTERMITTENCY: How often have you found you stopped and started again several times when you urinated?: Not at all  URGENCY: How often have you found it difficult to postpone urination?: Almost always  WEAK STREAM: How often have you had a weak urinary stream?: Not at all  STRAINING: How often have you had to strain to start  urination?: Not at all  NOCTURIA: How many times did you typically get up at night to uriniate?: 5 Times  TOTAL I-PSS SCORE[de-identified] 14  How would you feel if you were to spend the rest of your life with your urinary condition?: Terrible    Last BUN and creatinine:  Lab Results   Component Value Date    BUN 22 09/01/2015     Lab Results   Component Value Date    CREATININE 0.77 09/01/2015       Additional Lab/Culture results: none    PAST MEDICAL, FAMILY AND SOCIAL HISTORY UPDATE:  Past Medical History:   Diagnosis Date    Body mass index (BMI) of 20.0-20.9 in adult 2015    Endometriosis     had hyst    Former smoker 2015    Full dentures     Hearing decreased     History of menorrhagia     had hyst    Hyperlipidemia     Hyperlipidemia LDL goal < 130 2015    Hypothyroid     Nephrolithiasis     Left    Sciatica     Wears glasses      Past Surgical History:   Procedure Laterality Date    BACK SURGERY      \"used a small scope to Laser a few things\" Lumbar    BREAST BIOPSY Left     found cystic breast    CATARACT REMOVAL WITH IMPLANT Bilateral     COLONOSCOPY      CYSTOSCOPY      CYSTOSCOPY  2020    Cystoscopy, Left ureteroscopy with laser lithotripsy, Left stent exchange    HYSTERECTOMY      with BSO    LITHOTRIPSY Left 3-12-15    laser litho, stent exchanged    TONSILLECTOMY       Family History   Problem Relation Age of Onset    Heart Disease Mother     Stroke Mother      Outpatient Medications Marked as Taking for the 21 encounter (Office Visit) with Virlinda Burkitt, MD   Medication Sig Dispense Refill    simvastatin (ZOCOR) 20 MG tablet TAKE 1 TABLET BY MOUTH NIGHTLY 90 tablet 3    Apremilast (OTEZLA) 30 MG TABS Otezla 30 mg tablet   Take 1 tablet twice a day by oral route.  levothyroxine (SYNTHROID) 75 MCG tablet take 1 tablet by mouth once daily 90 tablet 3    halobetasol (ULTRAVATE) 0.05 % ointment APPLY TOPICALLY 2 TIMES DAILY.  50 g 3      (All medications reviewed and updated by provider sincelast office visit or hospitalization)   Sulfa antibiotics  Social History     Tobacco Use   Smoking Status Former Smoker    Packs/day: 0.25    Years: 40.00    Pack years: 10.00    Start date: 2015   [employer] Quit date: 7/15/2019    Years since quittin.5   Smokeless Tobacco Never Used      (If patient a smoker, smoking cessation counseling offered)     Social History     Substance and Sexual Activity   Alcohol Use No    Alcohol/week: 0.0 standard drinks       REVIEW OF SYSTEMS:  Review of Systems      Physical Exam:      Vitals:    01/14/21 1010   BP: 120/81   Pulse: 105   Temp: 98.5 °F (36.9 °C)     There is no height or weight on file to calculate BMI. Patient is a [de-identified] y.o. female in noacute distress and alert and oriented to person, place and time. Physical Exam  Constitutional: Patient in no acute distress. Neuro: Alert andoriented to person, place and time. Psych: Mood normal, affect normal  Skin: No rash noted  HEENT: Head: Normocephalic and atraumatic  Conjunctivae and EOM are normal. Pupils are equal, round  Nose: Normal  Right External Ear: Normal; Left External Ear: Normal  Mouth: Mucosa Moist  Neck: Supple  Lungs: Respiratory effort is normal  Cardiovascular: Warm & Pink  Abdomen: Soft, non-tender, non-distended with no CVA,  No flank tenderness,  Or hepatosplenomegaly   Lymphatics: No palpable lymphadenopathy. Bladder non-tender and not distended. Musculoskeletal: Normalgait and station      and Plan      1. Kidney stone    2. Flank pain    3. Other hydronephrosis           Plan:    cysto left urs laser lith and stent change  Return for Surgery. Prescriptions Ordered:  No orders of the defined types were placed in this encounter. Orders Placed:  No orders of the defined types were placed in this encounter. Antonio Salinas MD    Agree with the ROS entered by the MA.

## 2021-01-14 NOTE — TELEPHONE ENCOUNTER
Cysto, (Lt) URS, HLL, (Lt) Stent Exchange 2/4/2021 3:15pm @ [facility]  PAT @ [facility] 1/25/21 3:30pm  COVID-19 Testing @ [facility] 2/1/21 3:25pm      Spoke with patient regarding all procedure information. Procedure info given to patient in the office. Booking sheet and notes faxed to [facility] on 1/14/2021.

## 2021-02-03 ENCOUNTER — TELEPHONE (OUTPATIENT)
Dept: UROLOGY | Age: 81
End: 2021-02-03

## 2021-02-03 NOTE — TELEPHONE ENCOUNTER
Call placed to patient regarding time change for surgical procedure on 2/4. Patient aware that new arrival time is at 1200.

## 2021-03-25 ENCOUNTER — OFFICE VISIT (OUTPATIENT)
Dept: UROLOGY | Age: 81
End: 2021-03-25
Payer: COMMERCIAL

## 2021-03-25 VITALS — TEMPERATURE: 97.5 F | SYSTOLIC BLOOD PRESSURE: 127 MMHG | DIASTOLIC BLOOD PRESSURE: 79 MMHG | HEART RATE: 86 BPM

## 2021-03-25 DIAGNOSIS — R39.15 URGENCY OF URINATION: ICD-10-CM

## 2021-03-25 DIAGNOSIS — N20.0 KIDNEY STONES: Primary | ICD-10-CM

## 2021-03-25 PROCEDURE — 99213 OFFICE O/P EST LOW 20 MIN: CPT | Performed by: UROLOGY

## 2021-03-25 ASSESSMENT — ENCOUNTER SYMPTOMS
BACK PAIN: 1
WHEEZING: 0
COUGH: 0
EYE PAIN: 0
CONSTIPATION: 0
NAUSEA: 0
EYE REDNESS: 0
VOMITING: 0
ABDOMINAL PAIN: 0
DIARRHEA: 0
SHORTNESS OF BREATH: 0

## 2021-03-25 NOTE — PROGRESS NOTES
[address] [address]  Dept: [address] [facility] Urology Office Note - Established    Patient:  Camelia Durham  YOB: 1940  Date: 3/25/2021    The patient is a 80 y.o. female whopresents today for evaluation of the following problems:   Chief Complaint   Patient presents with    Follow Up After Procedure       HPI  Here for kidney stone, had left ureteral and renal stones, recently treated, this was an acute issue, having some acute urine urgency. Summary of old records: N/A    Additional History: N/A    Procedures Today: N/A    Urinalysis today:  No results found for this visit on 03/25/21. Imaging Reviewed during this Office Visit: none  (results were independently reviewed by physician and radiology report verified)    AUA Symptom Score (3/25/2021):   INCOMPLETE EMPTYING: How often have you had the sensation of not emptying your bladder?: Not at all  FREQUENCY: How often do you have to urinate less than every two hours?: Less than Half the time  INTERMITTENCY: How often have you found you stopped and started again several times when you urinated?: Not at all  URGENCY: How often have you found it difficult to postpone urination?: Not at all  WEAK STREAM: How often have you had a weak urinary stream?: Not at all  STRAINING: How often have you had to strain to start  urination?: Not at all  NOCTURIA: How many times did you typically get up at night to uriniate?: 3 Times  TOTAL I-PSS SCORE[de-identified] 5  How would you feel if you were to spend the rest of your life with your urinary condition?: Mixe    Last BUN and creatinine:  Lab Results   Component Value Date    BUN 22 09/01/2015     Lab Results   Component Value Date    CREATININE 0.77 09/01/2015       Additional Lab/Culture results: none    PAST MEDICAL, FAMILY AND SOCIAL HISTORY UPDATE:  Past Medical History:   Diagnosis Date    Body mass index (BMI) of 20.0-20.9 in adult 2015    Endometriosis     had hyst    Former smoker 2015    Full dentures     Hearing decreased     History of menorrhagia     had hyst    Hyperlipidemia     Hyperlipidemia LDL goal < 130 2015    Hypothyroid     Nephrolithiasis     Left    Sciatica     Wears glasses      Past Surgical History:   Procedure Laterality Date    BACK SURGERY      \"used a small scope to Laser a few things\" Lumbar    BREAST BIOPSY Left     found cystic breast    CATARACT REMOVAL WITH IMPLANT Bilateral     COLONOSCOPY      CYSTOSCOPY      CYSTOSCOPY  2020    Cystoscopy, Left ureteroscopy with laser lithotripsy, Left stent exchange    CYSTOSCOPY  2021    Cystoscopy, left stent removal, left ureteroscopy, left laser lithotripsy, left stent placement.     HYSTERECTOMY      with BSO    LITHOTRIPSY Left 2015    laser litho, stent exchanged    TONSILLECTOMY       Family History   Problem Relation Age of Onset    Heart Disease Mother     Stroke Mother      Outpatient Medications Marked as Taking for the 3/25/21 encounter (Office Visit) with Buena Riedel, MD   Medication Sig Dispense Refill    simvastatin (ZOCOR) 20 MG tablet TAKE 1 TABLET NIGHTLY 90 tablet 3    levothyroxine (SYNTHROID) 75 MCG tablet take 1 tablet by mouth once daily 90 tablet 3      (All medications reviewed and updated by provider sincelast office visit or hospitalization)   Sulfa antibiotics  Social History     Tobacco Use   Smoking Status Former Smoker    Packs/day: 0.25    Years: 40.00    Pack years: 10.00    Start date: 2015   [facility] Quit date: 7/15/2019    Years since quittin.6   Smokeless Tobacco Never Used      (If patient a smoker, smoking cessation counseling offered)     Social History     Substance and Sexual Activity   Alcohol Use No    Alcohol/week: 0.0 standard drinks       REVIEW OF SYSTEMS:  Review of Systems      Physical Exam:      Vitals:    21 0952   BP: 127/79 Pulse: 86   Temp: 97.5 °F (36.4 °C)     There is no height or weight on file to calculate BMI. Patient is a 80 y.o. female in noacute distress and alert and oriented to person, place and time. Physical Exam  Constitutional: Patient in no acute distress. Neuro: Alert andoriented to person, place and time. Psych: Mood normal, affect normal  Skin: No rash noted  HEENT: Head: Normocephalic and atraumatic  Conjunctivae and EOM are normal. Pupils are equal, round  Nose: Normal  Right External Ear: Normal; Left External Ear: Normal  Mouth: Mucosa Moist  Neck: Supple  Lungs: Respiratory effort is normal  Cardiovascular: Warm & Pink  Abdomen: Soft, non-tender, non-distended with no CVA,  No flank tenderness,  Or hepatosplenomegaly   Lymphatics: No palpable lymphadenopathy. Bladder non-tender and not distended. Musculoskeletal: Normalgait and station      and Plan      1. Kidney stones    2. Urgency of urination           Plan:    litholink  Serum labs  kub  Return in about 6 weeks (around 5/6/2021) for Follow up. Prescriptions Ordered:  No orders of the defined types were placed in this encounter. Orders Placed:  Orders Placed This Encounter   Procedures    XR ABDOMEN (KUB) (SINGLE AP VIEW)     Standing Status:   Future     Standing Expiration Date:   3/25/2022   Natasha Madsen     Standing Status:   Future     Standing Expiration Date:   3/25/2022    Calcium     Standing Status:   Future     Standing Expiration Date:   3/25/2022    Uric Acid     Standing Status:   Future     Standing Expiration Date:   3/20/2022            Osiris Butler MD    Agree with the ROS entered by the MA.

## 2021-04-22 ENCOUNTER — OFFICE VISIT (OUTPATIENT)
Dept: UROLOGY | Age: 81
End: 2021-04-22
Payer: COMMERCIAL

## 2021-04-22 VITALS
WEIGHT: 116 LBS | DIASTOLIC BLOOD PRESSURE: 85 MMHG | SYSTOLIC BLOOD PRESSURE: 139 MMHG | TEMPERATURE: 97.2 F | HEART RATE: 99 BPM | BODY MASS INDEX: 19.33 KG/M2 | HEIGHT: 65 IN

## 2021-04-22 DIAGNOSIS — R39.15 URGENCY OF URINATION: ICD-10-CM

## 2021-04-22 DIAGNOSIS — N20.0 KIDNEY STONES: Primary | ICD-10-CM

## 2021-04-22 PROCEDURE — 99213 OFFICE O/P EST LOW 20 MIN: CPT | Performed by: UROLOGY

## 2021-04-22 ASSESSMENT — ENCOUNTER SYMPTOMS
ABDOMINAL PAIN: 0
EYE PAIN: 0
EYE REDNESS: 0
BACK PAIN: 0
WHEEZING: 0
COUGH: 0
NAUSEA: 0
VOMITING: 0
CONSTIPATION: 0
SHORTNESS OF BREATH: 0

## 2021-04-22 NOTE — LETTER
[address] [address]  Dept: [phone]  Dept Fax: [phone]        4/22/21    Patient: David Jiménez  YOB: 1940    Dear Bernardo Mancilla MD,    I had the pleasure of seeing one of your patients, Colt Kee today in the office today. Below are the relevant portions of my assessment and plan of care. IMPRESSION:  1. Kidney stones    2. Urgency of urination        PLAN:  reviewed KUB- stone burden less. KUB 6 months. ltholink and labs reviewed     6 months f/u   Return in about 6 months (around 10/22/2021) for KUB. Prescriptions Ordered:  No orders of the defined types were placed in this encounter. Orders Placed:  Orders Placed This Encounter   Procedures    XR ABDOMEN (KUB) (SINGLE AP VIEW)     Standing Status:   Future     Standing Expiration Date:   4/22/2022         Thank you for allowing me to participate in the care of this patient. I will keep you updated on this patient's follow up and I look forward to serving you and your patients again in the future.       Hung Espinoza MD

## 2021-04-22 NOTE — PROGRESS NOTES
[address] [address]  Dept: [address] [facility] Urology Office Note - Established    Patient:  León Godinez  YOB: 1940  Date: 4/22/2021    The patient is a 80 y.o. female whopresents today for evaluation of the following problems:   Chief Complaint   Patient presents with    Results     6 week f/u KUB/litholink        HPI  Hx kidney stone- had LT URS 2/4/21. Here for follow up on litholink and KUB- completed both. She is having no backpain,. no hematuria. She is having less urgency and frequency, rare leak. She does not like water. She has had 2 stones in the past.   Summary of old records: N/A    Additional History: N/A    Procedures Today: N/A    Urinalysis today:  No results found for this visit on 04/22/21. Imaging Reviewed during this Office Visit:     Procedure: Abdomen x-ray(s) performed. Number of views:AP view    History:Renal calculi    Comparison:6/19/2020 and ultrasound dated 2/8/2021    Findings: No free air identified.  Calcifications are seen in the right upper quadrant consistent with cholelithiasis.  Small calcifications are seen overlying the left kidney as well consistent with left nephrolithiasis.  These are decreased in number when compared to prior study Bowel gas pattern   shows no acute findings.     Impression:    Left nephrolithiasis.  These appear to be decreased in number when compared to prior exam    Cholelithiasis    Workstation:[id number]    Finalized by Reji Montero DO on 3/29/2021 12:24 PM    (results were independently reviewed by physician and radiology report verified)    AUA Symptom Score (4/22/2021):                               Last BUN and creatinine:  Lab Results   Component Value Date    BUN 22 09/01/2015     Lab Results   Component Value Date    CREATININE 0.77 09/01/2015       Additional Lab/Culture results:PAST MEDICAL, FAMILY AND SOCIAL HISTORY UPDATE:  Past Medical History:   Diagnosis Date    Body mass index (BMI) of 20.0-20.9 in adult 8/27/2015    Endometriosis     had hyst    Former smoker 8/27/2015    Full dentures     Hearing decreased     History of menorrhagia     had hyst    Hyperlipidemia     Hyperlipidemia LDL goal < 130 8/27/2015    Hypothyroid     Nephrolithiasis     Left    Sciatica     Wears glasses      Past Surgical History:   Procedure Laterality Date    BACK SURGERY      \"used a small scope to Laser a few things\" Lumbar    BREAST BIOPSY Left     found cystic breast    CATARACT REMOVAL WITH IMPLANT Bilateral     COLONOSCOPY      CYSTOSCOPY      CYSTOSCOPY  06/12/2020    Cystoscopy, Left ureteroscopy with laser lithotripsy, Left stent exchange    CYSTOSCOPY  02/06/2021    Cystoscopy, left stent removal, left ureteroscopy, left laser lithotripsy, left stent placement.  HYSTERECTOMY      with BSO    LITHOTRIPSY Left 03/12/2015    laser litho, stent exchanged    TONSILLECTOMY       Family History   Problem Relation Age of Onset    Heart Disease Mother     Stroke Mother      Outpatient Medications Marked as Taking for the 4/22/21 encounter (Office Visit) with Luz Silva MD   Medication Sig Dispense Refill    simvastatin (ZOCOR) 20 MG tablet TAKE 1 TABLET NIGHTLY 90 tablet 3    levothyroxine (SYNTHROID) 75 MCG tablet take 1 tablet by mouth once daily 90 tablet 3    naproxen (NAPROSYN) 375 MG tablet Take 1 tablet by mouth 2 times daily as needed for Pain 60 tablet 0    Apremilast (OTEZLA) 30 MG TABS Otezla 30 mg tablet   Take 1 tablet twice a day by oral route.  halobetasol (ULTRAVATE) 0.05 % ointment APPLY TOPICALLY 2 TIMES DAILY.  50 g 3      (All medications reviewed and updated by provider sincelast office visit or hospitalization)   Sulfa antibiotics  Social History     Tobacco Use   Smoking Status Former Smoker    Packs/day: 0.25    Years: 40.00    Pack years: 10.00    Start date: 2/13/2015   [employer] date: 7/15/2019    Years since quittin.7   Smokeless Tobacco Never Used      (If patient a smoker, smoking cessation counseling offered)     Social History     Substance and Sexual Activity   Alcohol Use No    Alcohol/week: 0.0 standard drinks       REVIEW OF SYSTEMS:  Review of Systems      Physical Exam:      Vitals:    21 1142   BP: 139/85   Pulse: 99   Temp: 97.2 °F (36.2 °C)     Body mass index is 19.3 kg/m². Patient is a 80 y.o. female in noacute distress and alert and oriented to person, place and time. Physical Exam  Constitutional: Patient in no acute distress. Neuro: Alert andoriented to person, place and time. Psych: Mood normal, affect normal  Skin: No rash noted  HEENT: Head: Normocephalic and atraumatic  Conjunctivae and EOM are normal. Pupils are equal, round  Nose: Normal  Right External Ear: Normal; Left External Ear: Normal  Mouth: Mucosa Moist  Neck: Supple  Lungs: Respiratory effort is normal  Cardiovascular: Warm & Pink  Abdomen: Soft, non-tender, non-distended   Bladder non-tender and not distended. Musculoskeletal: Normal gait and station      and Plan      1. Kidney stones    2. Urgency of urination           Plan:    reviewed KUB- stone burden less. KUB 6 months. ltholink and labs reviewed     6 months f/u   Return in about 6 months (around 10/22/2021) for KUB. Prescriptions Ordered:  No orders of the defined types were placed in this encounter. Orders Placed:  Orders Placed This Encounter   Procedures    XR ABDOMEN (KUB) (SINGLE AP VIEW)     Standing Status:   Future     Standing Expiration Date:   2022            Natalio Osuna MD    reviewed and Agree with the ROS entered by the MA.

## 2021-04-26 ENCOUNTER — CLINICAL DOCUMENTATION (OUTPATIENT)
Dept: UROLOGY | Age: 81
End: 2021-04-26

## 2021-04-26 DIAGNOSIS — N20.0 KIDNEY STONES: ICD-10-CM

## 2021-10-28 ENCOUNTER — OFFICE VISIT (OUTPATIENT)
Dept: UROLOGY | Age: 81
End: 2021-10-28
Payer: COMMERCIAL

## 2021-10-28 ENCOUNTER — HOSPITAL ENCOUNTER (OUTPATIENT)
Age: 81
Setting detail: SPECIMEN
Discharge: HOME | End: 2021-10-28
Payer: COMMERCIAL

## 2021-10-28 VITALS
HEIGHT: 65 IN | WEIGHT: 105 LBS | HEART RATE: 85 BPM | BODY MASS INDEX: 17.49 KG/M2 | SYSTOLIC BLOOD PRESSURE: 126 MMHG | DIASTOLIC BLOOD PRESSURE: 75 MMHG | TEMPERATURE: 95.4 F

## 2021-10-28 DIAGNOSIS — N30.01 ACUTE CYSTITIS WITH HEMATURIA: ICD-10-CM

## 2021-10-28 DIAGNOSIS — R30.0 DYSURIA: ICD-10-CM

## 2021-10-28 DIAGNOSIS — N20.0 KIDNEY STONES: Primary | ICD-10-CM

## 2021-10-28 LAB
BILIRUBIN, POC: ABNORMAL
BLOOD URINE, POC: ABNORMAL
CLARITY, POC: ABNORMAL
COLOR, POC: ABNORMAL
GLUCOSE URINE, POC: ABNORMAL
KETONES, POC: ABNORMAL
LEUKOCYTE EST, POC: ABNORMAL
NITRITE, POC: ABNORMAL
PH, POC: ABNORMAL
PROTEIN, POC: ABNORMAL
SPECIFIC GRAVITY, POC: ABNORMAL
UROBILINOGEN, POC: ABNORMAL

## 2021-10-28 PROCEDURE — 99214 OFFICE O/P EST MOD 30 MIN: CPT | Performed by: NURSE PRACTITIONER

## 2021-10-28 RX ORDER — ONDANSETRON 4 MG/1
TABLET, ORALLY DISINTEGRATING ORAL
COMMUNITY
Start: 2021-10-04

## 2021-10-28 ASSESSMENT — ENCOUNTER SYMPTOMS
BACK PAIN: 0
NAUSEA: 1
COUGH: 0
EYE PAIN: 0
CONSTIPATION: 0
EYE REDNESS: 0
WHEEZING: 0
VOMITING: 0
SHORTNESS OF BREATH: 0
ABDOMINAL PAIN: 0

## 2021-10-28 NOTE — PROGRESS NOTES
[address] [address]  Dept: [address] [facility] Urology Office Note - Established    Patient:  Jodee Billing  YOB: 1940  Date: 10/28/2021    The patient is a 80 y.o. female whopresents today for evaluation of the following problems:   Chief Complaint   Patient presents with    6 Month Follow-Up     KUB care everywhere        HPI  Recently in ER for acute cystitis, UA abnormal (care everywhere), urine culture was contaminated specimen. She completed a course of keflex x 10day. She is feeling somewhat better. Her nausea, chills, and fever had improved. Intermittent dysuria, no gross hematuria. She has intermittent right flank pain. She has urgency with urge incontinence at times which is worse when she has an infection. She has hx kidney stones, had XR at time of ER visit and not well visualized on XR. Summary of old records: N/A    Additional History: N/A    Procedures Today: N/A    Urinalysis today:  Results for POC orders placed in visit on 10/28/21   POCT Urinalysis no Micro   Result Value Ref Range    Color, UA Dark Yellow     Clarity, UA Cloudy     Glucose, UA POC Neg     Bilirubin, UA      Ketones, UA      Spec Grav, UA      Blood, UA POC Trace     pH, UA      Protein, UA POC +     Urobilinogen, UA      Leukocytes, UA Pos     Nitrite, UA Pos        Imaging Reviewed during this Office Visit:   Narrative    XR ABDOM COMP SERIES W PA CHEST     HISTORY: Acute nausea and vomiting, abdominal pain. COMPARISON: Abdominal radiograph 3/29/2021. TECHNIQUE: PA chest and supine abdominal radiographs obtained. FINDINGS:     CHEST: No airspace opacity, pleural effusion, pneumothorax.  Cardiomediastinal silhouette and pulmonary vasculature within normal limits.      ABDOMEN: Nonobstructive bowel gas pattern.  Moderate amount of stool seen in the ascending colon.  Cholelithiasis.  Previously identified left nephrolithiasis not well visualized on today's exam.     Degenerative changes of lower lumbar spine, SI joints, hips. IMPRESSION:     1.  Nonobstructive bowel gas pattern. 2.  Cholelithiasis. Approved by Resident: Adriana Carlos MD  on 10/3/2021 8:11 PM     ISabrina MD have personally reviewed the image(s) and agree with and/or edited the report     Workstation:[id number]     Finalized by Sabrina Dillon MD on 10/3/2021 8:26 PM    (results were independently reviewed by physician and radiology report verified)    Last BUN and creatinine:  Lab Results   Component Value Date    BUN 22 09/01/2015     Lab Results   Component Value Date    CREATININE 0.77 09/01/2015       Additional Lab/Culture results: none. PAST MEDICAL, FAMILY AND SOCIAL HISTORY UPDATE:  Past Medical History:   Diagnosis Date    Body mass index (BMI) of 20.0-20.9 in adult 8/27/2015    Endometriosis     had hyst    Former smoker 8/27/2015    Full dentures     Hearing decreased     History of menorrhagia     had hyst    Hyperlipidemia     Hyperlipidemia LDL goal < 130 8/27/2015    Hypothyroid     Nephrolithiasis     Left    Sciatica     Wears glasses      Past Surgical History:   Procedure Laterality Date    BACK SURGERY      \"used a small scope to Laser a few things\" Lumbar    BREAST BIOPSY Left     found cystic breast    CATARACT REMOVAL WITH IMPLANT Bilateral     COLONOSCOPY      CYSTOSCOPY      CYSTOSCOPY  06/12/2020    Cystoscopy, Left ureteroscopy with laser lithotripsy, Left stent exchange    CYSTOSCOPY  02/06/2021    Cystoscopy, left stent removal, left ureteroscopy, left laser lithotripsy, left stent placement.     HYSTERECTOMY      with BSO    LITHOTRIPSY Left 03/12/2015    laser litho, stent exchanged    TONSILLECTOMY       Family History   Problem Relation Age of Onset    Heart Disease Mother     Stroke Mother      Outpatient Medications Marked as Taking for the 10/28/21 encounter (Office Visit) with ARUNA Wilson CNP   Medication Sig Dispense Refill    ondansetron (ZOFRAN-ODT) 4 MG disintegrating tablet DISSOLVE 1 TABLET IN MOUTH EVERY EIGHT HOURS AS NEEDED FOR NAUSEA      simvastatin (ZOCOR) 20 MG tablet TAKE 1 TABLET NIGHTLY 90 tablet 3    levothyroxine (SYNTHROID) 75 MCG tablet take 1 tablet by mouth once daily 90 tablet 3    naproxen (NAPROSYN) 375 MG tablet Take 1 tablet by mouth 2 times daily as needed for Pain 60 tablet 0    Apremilast (OTEZLA) 30 MG TABS Otezla 30 mg tablet   Take 1 tablet twice a day by oral route.  halobetasol (ULTRAVATE) 0.05 % ointment APPLY TOPICALLY 2 TIMES DAILY. 50 g 3      (All medications reviewed and updated by provider sincelast office visit or hospitalization)   Sulfa antibiotics  Social History     Tobacco Use   Smoking Status Former Smoker    Packs/day: 0.25    Years: 40.00    Pack years: 10.00    Start date: 2015   [facility] Quit date: 7/15/2019    Years since quittin.2   Smokeless Tobacco Never Used      (If patient a smoker, smoking cessation counseling offered)     Social History     Substance and Sexual Activity   Alcohol Use No    Alcohol/week: 0.0 standard drinks       REVIEW OF SYSTEMS:  Review of Systems      Physical Exam:      Vitals:    10/28/21 1324   BP: 126/75   Pulse: 85   Temp: 95.4 °F (35.2 °C)     Body mass index is 17.47 kg/m². Patient is a 80 y.o. female in noacute distress and alert and oriented to person, place and time. Physical Exam  Constitutional: Patient in no acute distress. Neuro: Alert andoriented to person, place and time. Psych: Mood normal, affect normal  Skin: No rash noted  Lungs: Respiratory effort is normal  Cardiovascular: Warm & Pink  Abdomen: Soft, non-tender, non-distended with no CVA,  No flank tenderness. Bladder non-tender and not distended. Musculoskeletal: Walker      and Plan      1. Acute cystitis with hematuria    2. Dysuria    3. Kidney stones           Plan:    1. Urine dip abnormal, she just completed abx. We will send for culture    2. Timed and double voiding. Avoid irritants, more water and less tea. 3. If fever with symptoms, need to go to ER. 4. XR didn't visualize stones well, will repeat in 6 mos. 5. Call with questions or concerns. Return in about 6 months (around 4/28/2022) for KUB. Prescriptions Ordered:  No orders of the defined types were placed in this encounter. Orders Placed:  Orders Placed This Encounter   Procedures    Culture, Urine     Standing Status:   Future     Standing Expiration Date:   10/28/2022     Order Specific Question:   Specify (ex-cath, midstream, cysto, etc)? Answer:   midstream    POCT Urinalysis no ARUNA Paz CNP    Reviewed and agree with the ROS entered by the MA.

## 2021-10-28 NOTE — Clinical Note
[address] [address]  Phone: [phone]  Fax: [phone]    ARUNA Zamudio CNP    October 28, 2021     Cr Huertas, [address] [facility]  [address] [facility] [address]    Patient: Alessia Rodas   MR Number: [id number]   YOB: 1940   Date of Visit: 10/28/2021       Dear Cr Huertas:    Thank you for referring Luis Angel Chase to me for evaluation/treatment. Below are the relevant portions of my assessment and plan of care. If you have questions, please do not hesitate to call me. I look forward to following Ron Gan along with you.     Sincerely,      ARUNA Zamudio CNP

## 2021-10-28 NOTE — PATIENT INSTRUCTIONS
Plan:    1. Urine dip abnormal, she just completed abx. We will send for culture    2. Timed and double voiding. Avoid irritants, more water and less tea. 3. If fever with symptoms, need to go to ER. 4. XR didn't visualize stones well, will repeat in 6 mos. 5. Call with questions or concerns. Return in about 6 months (around 4/28/2022) for KUB.

## 2021-10-28 NOTE — PROGRESS NOTES
Review of Systems   Constitutional: Negative for chills, fatigue and fever. Eyes: Negative for pain, redness and visual disturbance. Respiratory: Negative for cough, shortness of breath and wheezing. Cardiovascular: Negative for chest pain and leg swelling. Gastrointestinal: Positive for nausea (off and on ). Negative for abdominal pain, constipation and vomiting. Genitourinary: Positive for decreased urine volume, flank pain (Right ), frequency and urgency. Negative for difficulty urinating, dysuria and hematuria. Musculoskeletal: Negative for back pain, joint swelling and myalgias. Skin: Negative for rash and wound. Neurological: Negative for dizziness, tremors and numbness. Hematological: Does not bruise/bleed easily.

## 2021-10-29 DIAGNOSIS — R30.0 DYSURIA: ICD-10-CM

## 2021-10-30 LAB
CULTURE: ABNORMAL
Lab: ABNORMAL
SPECIMEN DESCRIPTION: ABNORMAL

## 2021-10-31 DIAGNOSIS — R30.0 DYSURIA: ICD-10-CM

## 2021-10-31 DIAGNOSIS — N30.01 ACUTE CYSTITIS WITH HEMATURIA: Primary | ICD-10-CM

## 2021-10-31 RX ORDER — NITROFURANTOIN 25; 75 MG/1; MG/1
100 CAPSULE ORAL 2 TIMES DAILY
Qty: 14 CAPSULE | Refills: 0 | Status: SHIPPED | OUTPATIENT
Start: 2021-10-31 | End: 2021-11-07

## 2021-10-31 NOTE — PROGRESS NOTES
Urine culture + for e-coli. Pt symptomatic at her office visit 10/28/21(dysuria, hematuria)- will treat.

## 2021-11-01 ENCOUNTER — TELEPHONE (OUTPATIENT)
Dept: UROLOGY | Age: 81
End: 2021-11-01

## 2021-11-01 NOTE — TELEPHONE ENCOUNTER
Notified of + urine culture and abx were sent to pharmacy yesterday. Encouraged to increase water intake, reviewed feminine hygiene. Pt verbalized understanding, all questions answered.  Call ended

## 2022-03-21 NOTE — PROGRESS NOTES
Review of Systems   Constitutional: Negative for appetite change, chills and fever. Eyes: Negative for pain, redness and visual disturbance. Respiratory: Negative for cough, shortness of breath and wheezing. Cardiovascular: Negative for chest pain and leg swelling. Gastrointestinal: Negative for abdominal pain, constipation, diarrhea, nausea and vomiting. Genitourinary: Positive for frequency. Negative for difficulty urinating, dysuria, flank pain, hematuria and urgency. Musculoskeletal: Positive for back pain. Negative for joint swelling and myalgias. Skin: Negative for rash and wound. Neurological: Negative for dizziness, tremors and numbness. Hematological: Does not bruise/bleed easily. [No studies available for review at this time.] : No studies available for review at this time.

## 2022-04-29 ENCOUNTER — OFFICE VISIT (OUTPATIENT)
Dept: UROLOGY | Age: 82
End: 2022-04-29
Payer: COMMERCIAL

## 2022-04-29 VITALS
SYSTOLIC BLOOD PRESSURE: 120 MMHG | WEIGHT: 105 LBS | TEMPERATURE: 96.7 F | DIASTOLIC BLOOD PRESSURE: 80 MMHG | BODY MASS INDEX: 17.49 KG/M2 | HEIGHT: 65 IN

## 2022-04-29 DIAGNOSIS — N20.0 KIDNEY STONE: Primary | ICD-10-CM

## 2022-04-29 PROCEDURE — 99213 OFFICE O/P EST LOW 20 MIN: CPT | Performed by: NURSE PRACTITIONER

## 2022-04-29 RX ORDER — MELOXICAM 7.5 MG/1
TABLET ORAL
COMMUNITY
Start: 2022-04-13

## 2022-04-29 RX ORDER — LEVOTHYROXINE SODIUM 137 UG/1
TABLET ORAL
COMMUNITY
Start: 2022-04-15

## 2022-04-29 ASSESSMENT — ENCOUNTER SYMPTOMS
ABDOMINAL PAIN: 0
WHEEZING: 0
NAUSEA: 0
CONSTIPATION: 0
COUGH: 0
BACK PAIN: 0
SHORTNESS OF BREATH: 0
VOMITING: 0
DIARRHEA: 0
EYE PAIN: 0
EYE REDNESS: 0

## 2022-04-29 NOTE — PROGRESS NOTES
[address] [address]  Dept: [address] [facility] Urology Office Note - Established    Patient:  Ekaterina Penaloza  YOB: 1940  Date: 4/29/2022    The patient is a 80 y.o. female whopresents today for evaluation of the following problems:   Chief Complaint   Patient presents with    Follow-up     6 month w/ KUB        HPI  Patient is presenting for routine f/u kidney stones. She has had two stones in her lifetime. Last episode was 2/4/21- LT cysto, URS, HLL and stent. She had a KUB prior to today's visit which showed multiple stones L kidney, largest 6mm. Pt asymptomatic- no flank pain, hematuria, dysuria, recent  infections. Mentions she is seeing a new PCP, and was started on B12 injections. She feels her energy is improving. She is very happy with the care she is receiving. Summary of old records: N/A    Additional History: N/A    Procedures Today: N/A    Urinalysis today:  No results found for this visit on 04/29/22. Imaging Reviewed during this Office Visit:   Procedure Note    Shemar Oconnell MD - 04/28/2022   Formatting of this note might be different from the original.   CLINICAL INFORMATION: 58-year-old female with renal calculi assessment and follow-up. COMPARISON: Supine abdominal radiograph dated 10/03/2021 and CT abdomen pelvis dated 06/08/2020. VIEWS OBTAINED: Supine abdominal radiograph x1. FINDINGS     Nonobstructive bowel gas pattern.  Moderate amount of stool and gas throughout large bowel.  Multiple calculi within the left kidney, largest measuring 6 mm within the left renal pelvis.  Multiple small calculi within the right upper quadrant that correlate with biliary gravel within the common bile    duct on previous CT exam.  Degenerative changes of the lower lumbar spine and left hip joint.      IMPRESSION:     *  Multiple renal calculi in the left kidney with the largest measuring 6 mm in the left renal pelvis. *  Layering cholelithiasis. [facility] Approved by Resident Ziyad Carrillo DO  on 4/28/2022 8:01 AM     IPrashanth MD have personally reviewed the image(s) and agree with and/or edited the report     Workstation:[id number]     Finalized by Prashanth Walton MD on 4/28/2022 8:14 AM    (results were independently reviewed by physician and radiology report verified)      Last BUN and creatinine:  Lab Results   Component Value Date    BUN 22 09/01/2015     Lab Results   Component Value Date    CREATININE 0.77 09/01/2015       Additional Lab/Culture results: none    PAST MEDICAL, FAMILY AND SOCIAL HISTORY UPDATE:  Past Medical History:   Diagnosis Date    Body mass index (BMI) of 20.0-20.9 in adult 8/27/2015    Endometriosis     had hyst    Former smoker 8/27/2015    Full dentures     Hearing decreased     History of menorrhagia     had hyst    Hyperlipidemia     Hyperlipidemia LDL goal < 130 8/27/2015    Hypothyroid     Nephrolithiasis     Left    Sciatica     Wears glasses      Past Surgical History:   Procedure Laterality Date    BACK SURGERY      \"used a small scope to Laser a few things\" Lumbar    BREAST BIOPSY Left     found cystic breast    CATARACT REMOVAL WITH IMPLANT Bilateral     COLONOSCOPY      CYSTOSCOPY      CYSTOSCOPY  06/12/2020    Cystoscopy, Left ureteroscopy with laser lithotripsy, Left stent exchange    CYSTOSCOPY  02/06/2021    Cystoscopy, left stent removal, left ureteroscopy, left laser lithotripsy, left stent placement.     HYSTERECTOMY      with BSO    LITHOTRIPSY Left 03/12/2015    laser litho, stent exchanged    TONSILLECTOMY       Family History   Problem Relation Age of Onset    Heart Disease Mother     Stroke Mother      Outpatient Medications Marked as Taking for the 4/29/22 encounter (Office Visit) with ARUNA Wilson - CNP   Medication Sig Dispense Refill    meloxicam (MOBIC) 7.5 MG tablet TAKE 1 TABLET (7.5 MG TOTAL) BY MOUTH IN THE MORNING.  levothyroxine (SYNTHROID) 137 MCG tablet TAKE 1 TABLET BY MOUTH EVERY MORNING.  simvastatin (ZOCOR) 20 MG tablet Take 1 tablet by mouth nightly 90 tablet 3    ondansetron (ZOFRAN-ODT) 4 MG disintegrating tablet DISSOLVE 1 TABLET IN MOUTH EVERY EIGHT HOURS AS NEEDED FOR NAUSEA      naproxen (NAPROSYN) 375 MG tablet Take 1 tablet by mouth 2 times daily as needed for Pain 60 tablet 0    Apremilast (OTEZLA) 30 MG TABS Otezla 30 mg tablet   Take 1 tablet twice a day by oral route.  halobetasol (ULTRAVATE) 0.05 % ointment APPLY TOPICALLY 2 TIMES DAILY. 50 g 3      (All medications reviewed and updated by provider sincelast office visit or hospitalization)   Sulfa antibiotics  Social History     Tobacco Use   Smoking Status Former Smoker    Packs/day: 0.25    Years: 40.00    Pack years: 10.00    Start date: 2015   [facility] Quit date: 7/15/2019    Years since quittin.7   Smokeless Tobacco Never Used      (If patient a smoker, smoking cessation counseling offered)     Social History     Substance and Sexual Activity   Alcohol Use No    Alcohol/week: 0.0 standard drinks       REVIEW OF SYSTEMS:  Review of Systems      Physical Exam:      Vitals:    22 1123   BP: 120/80   Temp: 96.7 °F (35.9 °C)     Body mass index is 17.47 kg/m². Patient is a 80 y.o. female in noacute distress and alert and oriented to person, place and time. Physical Exam  Constitutional: Patient in no acute distress. Neuro: Alert andoriented to person, place and time. Psych: Mood normal, affect normal  Skin: No rash noted  Lungs: Respiratory effort is normal  Cardiovascular: Warm & Pink  Abdomen: Soft, non-tender, non-distended with no CVA,  No flank tenderness  Bladder non-tender and not distended. Musculoskeletal: Ambulates with wheeled walker. and Plan      1. Kidney stone           Plan:    She has stones in left side, largest measuring 6mm.    She is asymptomatic. Discussed options- monitoring vs. ESWL. She prefers to continuing monitoring at this time. Will repeat KUB 6 mos- if increasing in size, consider ESWL with Dr. Gloria Romero  S/s obstructing stone thoroughly reviewed- advised ER if symptoms develop. Stone prevention reviewed. Call with any new or worsening urinary symptoms, questions or concerns. Return in about 6 months (around 10/29/2022) for KUB, on Dr. Gloria Romero day. .    Prescriptions Ordered:  No orders of the defined types were placed in this encounter. Orders Placed:  Orders Placed This Encounter   Procedures    XR ABDOMEN (KUB) (SINGLE AP VIEW)     Standing Status:   Future     Standing Expiration Date:   4/29/2023     Order Specific Question:   Reason for exam:     Answer:   kidney stone            ARUNA Roth CNP    Reviewed and agree with the ROS entered by the MA.

## 2022-11-14 ENCOUNTER — OFFICE VISIT (OUTPATIENT)
Dept: UROLOGY | Age: 82
End: 2022-11-14
Payer: COMMERCIAL

## 2022-11-14 VITALS
HEART RATE: 85 BPM | BODY MASS INDEX: 17.49 KG/M2 | HEIGHT: 65 IN | DIASTOLIC BLOOD PRESSURE: 80 MMHG | SYSTOLIC BLOOD PRESSURE: 120 MMHG | OXYGEN SATURATION: 97 % | WEIGHT: 105 LBS

## 2022-11-14 DIAGNOSIS — N20.0 KIDNEY STONES: Primary | ICD-10-CM

## 2022-11-14 PROCEDURE — 1123F ACP DISCUSS/DSCN MKR DOCD: CPT | Performed by: NURSE PRACTITIONER

## 2022-11-14 PROCEDURE — 99213 OFFICE O/P EST LOW 20 MIN: CPT | Performed by: NURSE PRACTITIONER

## 2022-11-14 ASSESSMENT — ENCOUNTER SYMPTOMS
SHORTNESS OF BREATH: 0
WHEEZING: 0
COUGH: 0

## 2023-05-15 ENCOUNTER — OFFICE VISIT (OUTPATIENT)
Dept: UROLOGY | Age: 83
End: 2023-05-15
Payer: COMMERCIAL

## 2023-05-15 VITALS
HEART RATE: 60 BPM | RESPIRATION RATE: 15 BRPM | HEIGHT: 65 IN | DIASTOLIC BLOOD PRESSURE: 65 MMHG | TEMPERATURE: 98.9 F | BODY MASS INDEX: 17.49 KG/M2 | SYSTOLIC BLOOD PRESSURE: 96 MMHG | WEIGHT: 105 LBS

## 2023-05-15 DIAGNOSIS — N20.0 LEFT RENAL STONE: Primary | ICD-10-CM

## 2023-05-15 PROCEDURE — 99213 OFFICE O/P EST LOW 20 MIN: CPT | Performed by: NURSE PRACTITIONER

## 2023-05-15 PROCEDURE — 1123F ACP DISCUSS/DSCN MKR DOCD: CPT | Performed by: NURSE PRACTITIONER

## 2023-05-15 ASSESSMENT — ENCOUNTER SYMPTOMS
CONSTIPATION: 0
COUGH: 0
EYE PAIN: 0
DIARRHEA: 0
BACK PAIN: 0
EYE REDNESS: 0
VOMITING: 0
WHEEZING: 0
ABDOMINAL PAIN: 0
SHORTNESS OF BREATH: 0
NAUSEA: 0

## 2023-05-15 NOTE — PROGRESS NOTES
[address] [address]  Dept: [address] [facility] Urology Office Note - Established    Patient:  Meghan Holt  YOB: 1940  Date: 5/15/2023    The patient is a 80 y.o. female whopresents today for evaluation of the following problems:   Chief Complaint   Patient presents with    Results     6 months kub [place] in care everywhere. HPI  This is an 24-year-old female with history of kidney stones presenting for follow-up. Last stone was 2/2021- Lt URS HLL and stent placement. She has a known left renal stone, 6mm in size. KUB repeated prior to today's appt and shows stable size. She is asymptomatic, she mentions she would not want any procedures unless absolutely necessary. Summary of old records: N/A    Additional History: N/A    Procedures Today: N/A    Urinalysis today:  No results found for this visit on 05/15/23. Imaging Reviewed during this Office Visit:   X-ray abdomen ap 1 view    Anatomical Region Laterality Modality   Abdomen -- Computed Radiography     Narrative    History:  Left kidney stones     Exam/Technique:  AP view abdomen supine     Comparison:  10/31/2022 x-ray     Findings: There are multiple coarse calcifications overlying the left renal bed largest is 6 mm oval stone. There are cluster of coarse calcifications at the right upper quadrant most consistent with numerous gallstones. There are moderate to severe bilateral hip joint disease worse on the left. There are moderate to disc disease at L4-L5 and L5-S1. IMPRESSION:  Stable exam with the multiple calcified left side renal stones and numerous calcified gallstones     Workstation:[id number]     Finalized by Micheal Butler MD on 5/10/2023 1:56 AM  (results were independently reviewed by physician and radiology report verified)    AUA Symptom Score (5/15/2023):   INCOMPLETE EMPTYING: How

## 2023-12-13 DIAGNOSIS — N20.0 LEFT RENAL STONE: ICD-10-CM

## 2024-06-12 ENCOUNTER — TELEPHONE (OUTPATIENT)
Dept: UROLOGY | Age: 84
End: 2024-06-12

## 2024-06-12 NOTE — TELEPHONE ENCOUNTER
Writer called patient and informed patient that [facility] is not accepting [place] insurance and asked patient if she would like us to fax over to [place] office. Writer informed patient that if she is seen here I can not guarantee she will not have a bill. Patient stated, \" She will still come to this office and than if she needs to she will switch  to the other office.\" Writer informed patient that this is fine I just can not guarantee she will not have a bill. Patient verbalized understanding.